# Patient Record
(demographics unavailable — no encounter records)

---

## 2025-01-07 NOTE — DISCUSSION/SUMMARY
[Please See Note in Chart and Documentation in Trial DB] : Please see note in chart and documentation in Trial DB. [FreeTextEntry3] : 47 y.o. M here for annual [facility] monitoring visit, v17.   Pt is overall feeling well, no acute complaints. He had a repeat endoscopy/colo in 2024 which were normal. He is no longer on omeprazole and only takes famotidine PRN which is rarely as he does not have heartburn symptoms. He had blood work with PCP in December and had elevated LDL, a1c and wanted to discuss this. He is working on healthier eating and exercising.   PCP: Dr. 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

## 2025-01-07 NOTE — HEALTH RISK ASSESSMENT
[Patient reported colonoscopy was normal] : Patient reported colonoscopy was normal [ColonoscopyDate] : 07/2024 [ColonoscopyComments] : normal, return in 10 years